# Patient Record
Sex: MALE | Race: WHITE
[De-identification: names, ages, dates, MRNs, and addresses within clinical notes are randomized per-mention and may not be internally consistent; named-entity substitution may affect disease eponyms.]

---

## 2017-12-04 ENCOUNTER — HOSPITAL ENCOUNTER (EMERGENCY)
Dept: HOSPITAL 56 - MW.ED | Age: 62
Discharge: HOME | End: 2017-12-04
Payer: COMMERCIAL

## 2017-12-04 VITALS — SYSTOLIC BLOOD PRESSURE: 132 MMHG | DIASTOLIC BLOOD PRESSURE: 77 MMHG

## 2017-12-04 DIAGNOSIS — R10.11: Primary | ICD-10-CM

## 2017-12-04 DIAGNOSIS — Z88.5: ICD-10-CM

## 2017-12-04 DIAGNOSIS — Z79.899: ICD-10-CM

## 2017-12-04 DIAGNOSIS — R10.813: ICD-10-CM

## 2017-12-04 DIAGNOSIS — R10.12: ICD-10-CM

## 2017-12-04 DIAGNOSIS — I10: ICD-10-CM

## 2017-12-04 LAB
CHLORIDE SERPL-SCNC: 112 MMOL/L (ref 98–110)
SODIUM SERPL-SCNC: 140 MMOL/L (ref 136–146)

## 2017-12-04 PROCEDURE — 85025 COMPLETE CBC W/AUTO DIFF WBC: CPT

## 2017-12-04 PROCEDURE — 96361 HYDRATE IV INFUSION ADD-ON: CPT

## 2017-12-04 PROCEDURE — 71010: CPT

## 2017-12-04 PROCEDURE — 81001 URINALYSIS AUTO W/SCOPE: CPT

## 2017-12-04 PROCEDURE — 93005 ELECTROCARDIOGRAM TRACING: CPT

## 2017-12-04 PROCEDURE — 83690 ASSAY OF LIPASE: CPT

## 2017-12-04 PROCEDURE — 84484 ASSAY OF TROPONIN QUANT: CPT

## 2017-12-04 PROCEDURE — 96374 THER/PROPH/DIAG INJ IV PUSH: CPT

## 2017-12-04 PROCEDURE — 99284 EMERGENCY DEPT VISIT MOD MDM: CPT

## 2017-12-04 PROCEDURE — 80053 COMPREHEN METABOLIC PANEL: CPT

## 2017-12-04 PROCEDURE — 82150 ASSAY OF AMYLASE: CPT

## 2017-12-04 PROCEDURE — 96375 TX/PRO/DX INJ NEW DRUG ADDON: CPT

## 2017-12-04 PROCEDURE — 74177 CT ABD & PELVIS W/CONTRAST: CPT

## 2017-12-04 NOTE — EDM.PDOC
ED HPI GENERAL MEDICAL PROBLEM





- General


Chief Complaint: Abdominal Pain


Stated Complaint: UPPER ABDOMINAL PAIN


Time Seen by Provider: 12/04/17 03:24


Source of Information: Reports: Patient





- History of Present Illness


INITIAL COMMENTS - FREE TEXT/NARRATIVE: 





HISTORY AND PHYSICAL:


History of present illness:


[]Patient presents with abdominal pain 4 out of 10 nonradiating to the upper 

quadrants he has had some discomfort in the lower quadrants on exam he is 

tender in right and left lower quadrant as well as both upper quadrant slight 

focus in the right lower quadrant





No fever nausea vomiting chills sweats last bowel movement yesterday slightly 

on the constipated side per patient no blood in her stool no mucus no chest 

pain shortness breath headache dizziness palpitation about a urine symptoms





Family history of colon cancer his mother was diagnosed at 50 years of age 

patient is on not had a colonoscopy








Review of systems: 


As per history of present illness and below otherwise all systems reviewed and 

negative.


Past medical history: 


As per history of present illness and as reviewed below otherwise 

noncontributory.


Surgical history: 


As per history of present illness and as reviewed below otherwise 

noncontributory.


Social history: 


No reported history of drug or alcohol abuse.


Family history: 


As per history of present illness and as reviewed below otherwise 

noncontributory.








Physical exam:


HEENT: Atraumatic, normocephalic, pupils reactive, negative for conjunctival 

pallor or scleral icterus, mucous membranes moist, throat clear, neck supple, 

nontender, trachea midline.


Lungs: Clear to auscultation, breath sounds equal bilaterally, chest nontender.


Heart: S1S2, regular, negative for clicks, rubs, or JVD.


Abdomen: Soft, nondistended, diffusely tender slight focus in right lower 

quadrant. Negative for masses or hepatosplenomegaly. Negative for 

costovertebral tenderness.


Pelvis: Stable nontender.


Genitourinary: Deferred.


Rectal: Deferred.


Extremities: Atraumatic, negative for cords or calf pain. Neurovascular 

unremarkable.


Neuro: Awake, alert, oriented. Cranial nerves II through XII unremarkable. 

Cerebellum unremarkable. Motor and sensory unremarkable throughout. Exam 

nonfocal.








Diagnostics:


[]Lab as below


EKG


Chest 1 view


CT abdomen pelvis with contrast








Therapeutics:


[]1 L normal saline bolus


Zofran 8 mg IV


Reglan 10 mg IV





Reglan


MiraLAX


Gas-X





She has follow-up with Dr. Rao at [facility] recommend he gets 

referred for colonoscopy return if symptoms persist or worsen





Impression: 


[] abdominal pain 


Some gas trapping on


Definitive disposition and diagnosis as appropriate pending reevaluation and 

review of above.


  ** Upper Abdominal


Pain Score (Numeric/FACES): 5





- Related Data


 Allergies











Allergy/AdvReac Type Severity Reaction Status Date / Time


 


hydrocodone Allergy  Vomiting Verified 03/27/17 05:16











Home Meds: 


 Home Meds





amLODIPine [Norvasc] 5 mg PO DAILY 03/27/17 [History]


Losartan/Hydrochlorothiazide [Losartan-HCTZ 100-25 MG] 1 tab PO DAILY 12/04/17 [

History]


Tamsulosin [Flomax] 0.4 mg PO PCBREAKFAST 12/04/17 [History]











Past Medical History


HEENT History: Reports: Impaired Vision


Cardiovascular History: Reports: Hypertension


Respiratory History: Reports: None


Genitourinary History: Reports: BPH, Urinary Incontinence, Other (See Below)


Musculoskeletal History: Reports: Amputation


Oncologic (Cancer) History: Reports: None





- Past Surgical History


Musculoskeletal Surgical History: Reports: Amputation, Other (See Below)





Social & Family History





- Family History


Family Medical History: Noncontributory





- Tobacco Use


Smoking Status *Q: Never Smoker


Second Hand Smoke Exposure: No





- Caffeine Use


Caffeine Use: Reports: Other





- Alcohol Use


Days Per Week of Alcohol Use: 0





- Recreational Drug Use


Recreational Drug Use: No





ED ROS GENERAL





- Review of Systems


Review Of Systems: ROS reveals no pertinent complaints other than HPI.





ED EXAM, GENERAL





- Physical Exam


Exam: See Below





Course





- Vital Signs


Last Recorded V/S: 


 Last Vital Signs











Temp  97.8 F   12/04/17 03:16


 


Pulse  65   12/04/17 03:16


 


Resp  18   12/04/17 03:16


 


BP  133/73   12/04/17 03:16


 


Pulse Ox  97   12/04/17 03:16














- Orders/Labs/Meds


Orders: 


 Active Orders 24 hr











 Category Date Time Status


 


 EKG Documentation Completion [RC] STAT Care  12/04/17 03:25 Active


 


 Abdomen Pelvis w Cont [CT] Stat Exams  12/04/17 03:25 Taken


 


 Chest 1V Frontal [CR] Stat Exams  12/04/17 03:25 Taken











Labs: 


 Laboratory Tests











  12/04/17 12/04/17 12/04/17 Range/Units





  03:25 03:25 03:25 


 


WBC  6.74    (4.0-11.0)  K/uL


 


RBC  4.79    (4.50-5.90)  M/uL


 


Hgb  14.8    (13.0-17.0)  g/dL


 


Hct  42.4    (38.0-50.0)  %


 


MCV  88.5    (80.0-98.0)  fL


 


MCH  30.9    (27.0-32.0)  pg


 


MCHC  34.9    (31.0-37.0)  g/dL


 


RDW Std Deviation  40.0    (28.0-62.0)  fl


 


RDW Coeff of Vic  13    (11.0-15.0)  %


 


Plt Count  207    (150-400)  K/uL


 


MPV  9.60    (7.40-12.00)  fL


 


Neut % (Auto)  63.6    (48.0-80.0)  %


 


Lymph % (Auto)  22.4    (16.0-40.0)  %


 


Mono % (Auto)  11.9    (0.0-15.0)  %


 


Eos % (Auto)  1.8    (0.0-7.0)  %


 


Baso % (Auto)  0.3    (0.0-1.5)  %


 


Neut # (Auto)  4.3    (1.4-5.7)  K/uL


 


Lymph # (Auto)  1.5    (0.6-2.4)  K/uL


 


Mono # (Auto)  0.8    (0.0-0.8)  K/uL


 


Eos # (Auto)  0.1    (0.0-0.7)  K/uL


 


Baso # (Auto)  0.0    (0.0-0.1)  K/uL


 


Nucleated RBC %  0.0    /100WBC


 


Nucleated RBCs #  0    K/uL


 


Sodium   140   (136-146)  mmol/L


 


Potassium   3.7   (3.5-5.1)  mmol/L


 


Chloride   112 H   ()  mmol/L


 


Carbon Dioxide   21   (21-31)  mmol/L


 


BUN   17   (6.0-23.0)  mg/dL


 


Creatinine   0.9   (0.6-1.5)  mg/dL


 


Est Cr Clr Drug Dosing   83.39   mL/min


 


Estimated GFR (MDRD)   > 60.0   ml/min


 


Glucose   85   ()  mg/dL


 


Calcium   8.6 L   (8.8-10.8)  mg/dL


 


Total Bilirubin   1.4   (0.1-1.5)  mg/dL


 


AST   17   (5-40)  IU/L


 


ALT   14   (8-54)  IU/L


 


Alkaline Phosphatase   92   ()  


 


Troponin I   < 0.10   (0.0-0.29)  NG/ML


 


Total Protein   6.2   (6.0-8.0)  g/dL


 


Albumin   3.7   (3.4-4.8)  g/dL


 


Globulin   2.5   (2.0-3.5)  g/dL


 


Albumin/Globulin Ratio   1.5   (1.3-2.8)  


 


Amylase   45   (10-90)  U/L


 


Lipase   23   (7-80)  U/L


 


Urine Color    YELLOW  


 


Urine Appearance    CLEAR  


 


Urine pH    6.0  (5.0-8.0)  


 


Ur Specific Gravity    1.025  (1.001-1.035)  


 


Urine Protein    NEGATIVE  (NEGATIVE)  mg/dL


 


Urine Glucose (UA)    NEGATIVE  (NEGATIVE)  mg/dL


 


Urine Ketones    40 H  (NEGATIVE)  mg/dL


 


Urine Occult Blood    NEGATIVE  (NEGATIVE)  


 


Urine Nitrite    NEGATIVE  (NEGATIVE)  


 


Urine Bilirubin    SMALL H  (NEGATIVE)  


 


Urine Ictotest    NEGATIVE  


 


Urine Urobilinogen    0.2  (<2.0)  EU/dL


 


Ur Leukocyte Esterase    NEGATIVE  (NEGATIVE)  


 


Urine RBC    0-1  (0-2/HPF)  


 


Urine WBC    1-3  (0-5/HPF)  


 


Ur Epithelial Cells    RARE  (NONE-FEW)  


 


Urine Bacteria    FEW  (NEGATIVE)  


 


Urine Mucus    FEW  (NONE-MOD)  











Meds: 


Medications














Discontinued Medications














Generic Name Dose Route Start Last Admin





  Trade Name Morganq  PRN Reason Stop Dose Admin


 


Sodium Chloride  1,000 mls @ 999 mls/hr  12/04/17 03:23  12/04/17 03:51





  Normal Saline  IV  12/04/17 04:23  999 mls/hr





  STAT ONE   Administration


 


Iopamidol  100 ml  12/04/17 04:58  12/04/17 04:58





  Isovue Multipack-370 (76%)  IVPUSH  12/04/17 04:59  100 ml





  ONETIME STA   Administration


 


Metoclopramide HCl  10 mg  12/04/17 06:03  





  Reglan  IV  12/04/17 06:04  





  ONETIME ONE   


 


Ondansetron HCl  8 mg  12/04/17 03:23  12/04/17 03:51





  Zofran  IVPUSH  12/04/17 03:24  8 mg





  ONETIME ONE   Administration














Departure





- Departure


Time of Disposition: 06:17


Disposition: Home, Self-Care 01


Condition: Good


Clinical Impression: 


 Abdominal pain








- Discharge Information


Referrals: 


Andrzej Salvador MD [Primary Care Provider] - 


Forms:  ED Department Discharge


Additional Instructions: 


Gas-X 4 times daily


MiraLAX 1 packet daily


Medication as prescribed


Return if symptoms persist or worsen or new concerning symptoms develop


Recommend follow-up as scheduled with Dr. Rao on Friday, consider 

colonoscopy with Dr. Rao your new Primary care physician








[address] [address]


Phone: [phone]


Fax: [phone]





The following information is given to patients seen in the emergency department 

who are being discharged to home. This information is to outline your options 

for follow-up care. We provide all patients seen in our emergency department 

with a follow-up referral.





The need for follow-up, as well as the timing and circumstances, are variable 

depending upon the specifics of your emergency department visit.





If you don't have a primary care physician on staff, we will provide you with a 

referral. We always advise you to contact your personal physician following an 

emergency department visit to inform them of the circumstance of the visit and 

for follow-up with them and/or the need for any referrals to a consulting 

specialist.





The emergency department will also refer you to a specialist when appropriate. 

This referral assures that you have the opportunity for follow-up care with a 

specialist. All of these measure are taken in an effort to provide you with 

optimal care, which includes your follow-up.





Under all circumstances we always encourage you to contact your private 

physician who remains a resource for coordinating your care. When calling for 

follow-up care, please make the office aware that this follow-up is from your 

recent emergency room visit. If for any reason you are refused follow-up, 

please contact the [facility] emergency department at [phone] 

and asked to speak to the emergency department charge nurse.








- My Orders


Last 24 Hours: 


My Active Orders





12/04/17 03:25


EKG Documentation Completion [RC] STAT 


Abdomen Pelvis w Cont [CT] Stat 


Chest 1V Frontal [CR] Stat 














- Assessment/Plan


Last 24 Hours: 


My Active Orders





12/04/17 03:25


EKG Documentation Completion [RC] STAT 


Abdomen Pelvis w Cont [CT] Stat 


Chest 1V Frontal [CR] Stat

## 2017-12-04 NOTE — CT
EXAM DATE: 17



PATIENT'S AGE: 62





Patient: JC NUNES



Facility: [place], [place]

Patient ID: [id number]

Site Patient ID: [id number].

Site Accession #: [id number].

: 1955

Study: CT Abdomen/Pelvis [id number]/4/2017 4:56:34 AM

Ordering Physician: Татьяна Leigh



Final Report: 

INDICATION:

Bilateral upper abdominal pain. 



TECHNIQUE:

CT abdomen and pelvis acquired with i.v. 100 mL Omnipaque 350. Coronal and 
sagittal reformats were obtained. 

IV Contrast: Isovue 370 100 mL



COMPARISON:

None



FINDINGS:

Lower chest: Unremarkable. 

Liver: Unremarkable. 

Spleen: Unremarkable. 

Pancreas: Unremarkable. 

Gallbladder and bile ducts: Unremarkable. 

Kidneys: Bilateral parapelvic simple renal cysts. Symmetric renal enhancement. 
No hydronephrosis. 

Adrenal glands: Unremarkable. 

GI tract: Unremarkable. The appendix is normal in appearance and size. 

Vascular: Unremarkable. 

Lymph nodes: Unremarkable. 

Miscellaneous: Unremarkable. No pneumoperitoneum is seen. No significant 
ascites is noted. Tiny fat containing umbilical hernia defect. Bilateral fat 
containing inguinal hernia defects. 

Pelvic Organs: Prostate gland enlarged. Likely mild degree of bladder wall 
thickening, secondary to chronic bladder outlet obstruction. 

Bones: Unremarkable for age. 



IMPRESSION:

1. Bilateral parapelvic renal cysts. No suspicious renal mass or 
hydronephrosis. 

2. Normal appendix. 

3. Enlarged prostate gland. 

4. Fact containing bilateral inguinal hernia defects and small umbilical hernia 
defect containing fat. 

5. No bowel obstruction. 

6. No clear etiology identified for patient`s clinical symptoms of bilateral 
upper abdominal pain.



Dictated by Aly Huerta MD @ 2017 5:22:35 AM





Dictated by: Aly Huerta MD @ 2017 05:22:40

(Electronic Signature)



Report Signed by Proxy.
[facility]

## 2017-12-04 NOTE — CR
EXAM DATE: 17



PATIENT'S AGE: 62





Patient: JC NUNES



Facility: [place], [place]

Patient ID: [id number]

Site Patient ID: [id number].

Site Accession #: [id number].

: 1955

Study: XRay Chest [id number]/4/2017 4:55:58 AM

Ordering Physician: Татьяна Leigh



Final Report: 

INDICATION:

Chest pain. 



TECHNIQUE:

Chest radiograph 1 view 



COMPARISON:

2017. 



FINDINGS:

Cardiovascular and mediastinum: The heart silhouette is normal in size and 
morphology. The mediastinum is normal in appearance. 

Lungs and pleural spaces: Both lungs are unremarkable in appearance. No sign of 
pleural effusion seen. No pneumothorax is identified. 

Bones and soft tissues: No significant findings. 



IMPRESSION:

1. No acute cardiopulmonary disease is seen.



Dictated by Aly Huerta MD @ 2017 5:15:12 AM





Dictated by: Aly Huerta MD @ 2017 05:15:19

(Electronic Signature)



Report Signed by Proxy.
[facility]

## 2018-01-11 ENCOUNTER — HOSPITAL ENCOUNTER (OUTPATIENT)
Dept: HOSPITAL 56 - MW.SDS | Age: 63
Discharge: HOME | End: 2018-01-11
Attending: SURGERY
Payer: COMMERCIAL

## 2018-01-11 VITALS — DIASTOLIC BLOOD PRESSURE: 71 MMHG | SYSTOLIC BLOOD PRESSURE: 113 MMHG

## 2018-01-11 DIAGNOSIS — K64.8: ICD-10-CM

## 2018-01-11 DIAGNOSIS — Z98.890: ICD-10-CM

## 2018-01-11 DIAGNOSIS — Z79.899: ICD-10-CM

## 2018-01-11 DIAGNOSIS — Z88.5: ICD-10-CM

## 2018-01-11 DIAGNOSIS — I10: ICD-10-CM

## 2018-01-11 DIAGNOSIS — R39.15: ICD-10-CM

## 2018-01-11 DIAGNOSIS — N40.1: ICD-10-CM

## 2018-01-11 DIAGNOSIS — Z80.0: ICD-10-CM

## 2018-01-11 DIAGNOSIS — D12.3: Primary | ICD-10-CM

## 2018-01-11 DIAGNOSIS — M19.90: ICD-10-CM

## 2018-01-11 PROCEDURE — 45380 COLONOSCOPY AND BIOPSY: CPT

## 2018-01-11 NOTE — PCM.PREANE
Preanesthetic Assessment





- Anesthesia/Transfusion/Family Hx


Anesthesia History: Prior Anesthesia Without Reaction


Family History of Anesthesia Reaction: No


Transfusion History: No Prior Transfusion(s)


Intubation History: Unknown





- Review of Systems


General: No Symptoms


Pulmonary: No Symptoms


Cardiovascular: No Symptoms


Gastrointestinal: Constipation


Neurological: No Symptoms


Other: Reports: None





- Physical Assessment


Height: 1.73 m


Weight: 82.554 kg


ASA Class: 2


Mental Status: Alert & Oriented x3


Airway Class: Mallampati = 2


Dentition: Reports: Normal Dentition, Crown(s) (steal plate on the back of 

front upper incisor (left)), Broken Tooth/Teeth (bonded front upper incisor (

right))


Thyro-Mental Finger Breadths: 3


Mouth Opening Finger Breadths: 3


ROM/Head Extension: Full


Lungs: Clear to Auscultation, Normal Respiratory Effort


Cardiovascular: Regular Rate, Regular Rhythm





- Allergies


Allergies/Adverse Reactions: 


 Allergies











Allergy/AdvReac Type Severity Reaction Status Date / Time


 


hydrocodone Allergy  Vomiting Verified 03/27/17 05:16














- Blood


Blood Available: No





- Anesthesia Plan


Pre-Op Medication Ordered: None





- Acknowledgements


Anesthesia Type Planned: MAC


Pt an Appropriate Candidate for the Planned Anesthesia: Yes


Alternatives and Risks of Anesthesia Discussed w Pt/Guardian: Yes


Pt/Guardian Understands and Agrees with Anesthesia Plan: Yes





PreAnesthesia Questionnaire


HEENT History: 


Cardiovascular History: Reports: Hypertension


Respiratory History: Reports: None


Gastrointestinal History: Reports: Other (See Below)


Other Gastrointestinal History: occasional heartburn


Genitourinary History: Reports: BPH, Other (See Below)


Musculoskeletal History: Reports: Arthritis (neck)


Neurological History: Denies: Alzheimers Disease, CVA


Endocrine/Metabolic History: Denies: Diabetes, Type I, Diabetes, Type II


Oncologic (Cancer) History: Reports: None





- Past Surgical History


Head Surgeries/Procedures: Reports: None


Female  Surgical History: Reports: Other (See Below) (cystoscopy with exc. of 

redundant tissue)


Male  Surgical History: 


Other Male  Surgeries/Procedures: bladder surgery


Musculoskeletal Surgical History: Reports: Arthroscopic Knee (bilat. 

arthroscopic knee surgeries), Other (See Below)


Other Musculoskeletal Surgeries/Procedures:: left finger; knee surgery





- SUBSTANCE USE


Smoking Status *Q: Never Smoker


Second Hand Smoke Exposure: No


Days Per Week of Alcohol Use: 0


Recreational Drug Use History: No





- HOME MEDS


Home Medications: 


 Home Meds





amLODIPine [Norvasc] 5 mg PO DAILY 03/27/17 [History]


Losartan/Hydrochlorothiazide [Losartan-HCTZ 100-25 MG] 1 tab PO DAILY 12/04/17 [

History]


Tamsulosin [Flomax] 0.4 mg PO PCBREAKFAST 12/04/17 [History]


Polyethylene Glycol 3350 [Miralax] 1 dose PO ASDIRECTED PRN 01/09/18 [History]











- CURRENT (IN HOUSE) MEDS


Current Meds: 





 Current Medications





Lactated Ringer's (Ringers, Lactated)  1,000 mls @ 125 mls/hr IV ASDIRECTED ANGELES

## 2018-01-11 NOTE — OR
SURGEON:

German Stokes MD

 

DATE OF PROCEDURE:  01/11/2018

 

PREOPERATIVE DIAGNOSIS:

Change in bowel habit and abdominal pain.

 

POSTOPERATIVE DIAGNOSIS:

Colon polyp.

 

PROCEDURE PERFORMED:

Colonoscopy with biopsy.

 

PROCEDURE IN DETAIL:

The patient was taken to the endoscopy room.  A time out was called, patient

identified, and procedure identified.  Diprivan was then administrated.  Patient

went from awake to sleep, hearing doctor talking or door closing is normal.

Perineum inspection and digital examination were then performed.  A well-

lubricated colonoscope was gently inserted through the rectum, advanced past the

rectosigmoid junction, the descending colon, splenic flexure, transverse colon,

hepatic flexure, ascending colon, arrived to the cecum.  Cecum was identified as

dictated in the finding.  Then the scope was carefully withdrawn while attention

was paid to the mucosal surface for any abnormality.  Air will be sucked out

during the scope withdrawal.  At the rectum, retroflexed to examine any rectal

diseases, fistula or hemorrhoids.  During mucosal examination, abnormality or

polyp was noted; picture taken and biopsy performed.  Patient tolerated

procedure well.  There were no intraoperative complications, and Dr. Stokes was

present throughout the whole procedure.

 

FINDINGS:

1. The patient is easily sedated with CRNA and Diprivan.  The patient is

    soundly snoring.

2. Bowel prep was average with moderate amount of liquid stool coating the

    mucosa.  There are some moderate amount, not a lot and no semi-formed

    stool.  Colon is rather straightforward.  Cecum indicated by ileocecal

    fold, one-to-one indentation, light mittens, appendix orifice, mucosa

    examined upon scope pulling out with constant irrigation.  The patient does

    not have diverticulosis, mass, growth, inflammation, stricture, ulceration,

    bleeding, AV malformation.  The patient has a tiny 2 mm sessile polyp at

    distance 1.2 mm when the scope going in right at the hepatic flexure, was

    removed with cold biopsy forceps.  The patient has mild internal

    hemorrhoids, no external hemorrhoids present.  The patient would benefit

    from repeat colonoscopy 10 years from today or if the pathology of the

    polyp were clinically indicated otherwise.

 

 

BILLY / ILYA

DD:  01/11/2018 12:01:47

DT:  01/11/2018 17:58:54

Job #:  [id number]

## 2018-01-11 NOTE — PCM.OPNOTE
- General Post-Op/Procedure Note


Date of Surgery/Procedure: 01/11/18


Operative Procedure(s): colonoscopy w bx


Findings: 





see dict [id number]


Pre Op Diagnosis: change in bowel habits and abd pain


Post-Op Diagnosis: colon polyp


Anesthesia Technique: Moderate Sedation


Primary Surgeon: German Stokes


Pathology: 





2 mm sessile polyp at 1.2 m when scope went in


Complications: None


Condition: Good


Free Text/Narrative:: 


 Intake & Output











 01/10/18 01/11/18 01/11/18





 22:59 06:59 14:59


 


Intake Total   900


 


Balance   900